# Patient Record
Sex: FEMALE | Race: ASIAN
[De-identification: names, ages, dates, MRNs, and addresses within clinical notes are randomized per-mention and may not be internally consistent; named-entity substitution may affect disease eponyms.]

---

## 2020-10-26 ENCOUNTER — DASHBOARD ENCOUNTERS (OUTPATIENT)
Age: 25
End: 2020-10-26

## 2020-10-26 ENCOUNTER — OFFICE VISIT (OUTPATIENT)
Dept: URBAN - METROPOLITAN AREA CLINIC 78 | Facility: CLINIC | Age: 25
End: 2020-10-26
Payer: COMMERCIAL

## 2020-10-26 ENCOUNTER — WEB ENCOUNTER (OUTPATIENT)
Dept: URBAN - METROPOLITAN AREA CLINIC 78 | Facility: CLINIC | Age: 25
End: 2020-10-26

## 2020-10-26 DIAGNOSIS — R63.5 WEIGHT GAIN: ICD-10-CM

## 2020-10-26 DIAGNOSIS — R19.7 DIARRHEA: ICD-10-CM

## 2020-10-26 DIAGNOSIS — K90.9 STEATORRHEA: ICD-10-CM

## 2020-10-26 DIAGNOSIS — R10.9 ABDOMINAL CRAMPS: ICD-10-CM

## 2020-10-26 DIAGNOSIS — F41.9 ANXIETY: ICD-10-CM

## 2020-10-26 DIAGNOSIS — K58.9 IBS (IRRITABLE BOWEL SYNDROME)-DIARRHEA: ICD-10-CM

## 2020-10-26 PROBLEM — 66187002 STEATORRHEA: Status: ACTIVE | Noted: 2020-10-26

## 2020-10-26 PROBLEM — 48694002 ANXIETY: Status: ACTIVE | Noted: 2020-10-26

## 2020-10-26 PROBLEM — 10743008 IRRITABLE BOWEL SYNDROME: Status: ACTIVE | Noted: 2020-10-26

## 2020-10-26 PROCEDURE — G9903 PT SCRN TBCO ID AS NON USER: HCPCS | Performed by: INTERNAL MEDICINE

## 2020-10-26 PROCEDURE — G8427 DOCREV CUR MEDS BY ELIG CLIN: HCPCS | Performed by: INTERNAL MEDICINE

## 2020-10-26 PROCEDURE — G8482 FLU IMMUNIZE ORDER/ADMIN: HCPCS | Performed by: INTERNAL MEDICINE

## 2020-10-26 PROCEDURE — G8417 CALC BMI ABV UP PARAM F/U: HCPCS | Performed by: INTERNAL MEDICINE

## 2020-10-26 PROCEDURE — 99214 OFFICE O/P EST MOD 30 MIN: CPT | Performed by: INTERNAL MEDICINE

## 2020-10-26 RX ORDER — HYOSCYAMINE SULFATE 0.12 MG/1
TAKE 1 TABLET (0.125 MG) BY ORAL ROUTE EVERY 6 HOURS AS NEEDED FOR ABDOMINAL CRAMPS TABLET ORAL
Qty: 30 | Refills: 2 | Status: ACTIVE | COMMUNITY
Start: 2016-09-22

## 2020-10-26 RX ORDER — HYOSCYAMINE SULFATE 0.12 MG/1
TAKE 1 TABLET (0.125 MG) BY ORAL ROUTE EVERY 6 HOURS AS NEEDED FOR ABDOMINAL CRAMPS TABLET ORAL
Qty: 30 | Refills: 2
Start: 2016-09-22

## 2020-10-26 RX ORDER — CHOLESTYRAMINE 4 G/9G
1 PACKET MIXED WITH WATER OR NON-CARBONATED DRINK POWDER, FOR SUSPENSION ORAL ONCE A DAY
Qty: 30 | OUTPATIENT
Start: 2020-10-26

## 2020-10-26 NOTE — HPI-TODAY'S VISIT:
The patient had initially seen me on 2/16/16 and had described having abdominal pain as moderate in intensity, sharp and crampy in nature, occurring intermittently and after meals , and lasting a few hours . The pain was periumbilical and in the upper abdomen and had been present for the past years; improved by bowel movements and aggravated by eating.  She also reported a history of chronic diarrhea. She states that at age 11 she was hospitalized with a "bad GI infection" and since then, her bowel habits had never been normal. Her symptoms  have affected her QOL.   She reports that she started taking a Lactobacillus and Bifidobacterium probiotic daily. She did very well for several months, but eventually started having diarrhea again. She has had some pain with defecation, not improved despite treatment with NTG ointment (althoug patient admits that she was not very compliant with this as this triggered severe burning).  She has not had heartburn or dysphagia. The patient denies much in the way of nausea, vomiting, constipation, melena, hematemesis, fevers, chills. She has been compliant with avoiding all lactose prodcuts.  I treated her with Xifaxan in late September 2016 and she took it for the two weeks as prsecribed. Initiailly she felt somewhat better, but about 2 weeks after completing the Xifaxan, she started noticing the postprandial diarrhea again.   She continues to have oily stools,. She has quite a bit of cramping, usually preceding the BM's. She tends to have 5-6 BM's daily. It tends to irritate the perianal itching and pain. She had tried Desitin and NTG but admits that she could not tolerate these very well. She also admits to ongoing bloating, particularly at nighttime. She continues to gain weight slowly. She is now weighing 137 lbs. She has cut carbs and cheese off.   She walks on her 2 off days a week, for a good 1-1 1/2 hours.      Although the patient had denied any surgical history at her last appointment she did inform me today that she  underwent a cholecystectomy in 2017.  Denies a FH of IBD or celiac sprue. No recent antibiotic use or travel. She completed her college studies in [employer], with a minor in [place]ian. Her parents are originally from [facility].  Today we once again reviewed the results of her colonoscopy and prior labs.  Summary of prior GI workup: - Colonoscopy by me on 3/7/16 revealed a normal colon to the TI, except for IH's. Random colon biopsies were unremarkable. -Labs on 2/16/16 revealed a negative celiac panel, negative H pylori breath test, ESR normal at 22, and mildly elevated CRP of 12.5.

## 2020-12-04 ENCOUNTER — ERX REFILL RESPONSE (OUTPATIENT)
Dept: URBAN - METROPOLITAN AREA CLINIC 78 | Facility: CLINIC | Age: 25
End: 2020-12-04

## 2020-12-04 RX ORDER — CHOLESTYRAMINE 4 G/9G
1 PACKET MIXED WITH WATER OR NON-CARBONATED DRINK POWDER, FOR SUSPENSION ORAL ONCE A DAY
Qty: 30 | Refills: 0

## 2020-12-10 ENCOUNTER — OFFICE VISIT (OUTPATIENT)
Dept: URBAN - METROPOLITAN AREA CLINIC 78 | Facility: CLINIC | Age: 25
End: 2020-12-10

## 2020-12-10 RX ORDER — CHOLESTYRAMINE 4 G/9G
1 PACKET MIXED WITH WATER OR NON-CARBONATED DRINK POWDER, FOR SUSPENSION ORAL ONCE A DAY
Qty: 30 | OUTPATIENT

## 2020-12-10 NOTE — HPI-TODAY'S VISIT:
The patient had initially seen me on 2/16/16 and had described having abdominal pain as moderate in intensity, sharp and crampy in nature, occurring intermittently and after meals , and lasting a few hours . The pain was periumbilical and in the upper abdomen and had been present for the past years; improved by bowel movements and aggravated by eating.  She also reported a history of chronic diarrhea. She states that at age 11 she was hospitalized with a "bad GI infection" and since then, her bowel habits had never been normal. Her symptoms  have affected her QOL.   She reports that she started taking a Lactobacillus and Bifidobacterium probiotic daily. She did very well for several months, but eventually started having diarrhea again. She has had some pain with defecation, not improved despite treatment with NTG ointment (althoug patient admits that she was not very compliant with this as this triggered severe burning).  She has not had heartburn or dysphagia. The patient denies much in the way of nausea, vomiting, constipation, melena, hematemesis, fevers, chills. She has been compliant with avoiding all lactose prodcuts.  I treated her with Xifaxan in late September 2016 and she took it for the two weeks as prsecribed. Initiailly she felt somewhat better, but about 2 weeks after completing the Xifaxan, she started noticing the postprandial diarrhea again.   She continues to have oily stools,. She has quite a bit of cramping, usually preceding the BM's. She tends to have 5-6 BM's daily. It tends to irritate the perianal itching and pain. She had tried Desitin and NTG but admits that she could not tolerate these very well. She also admits to ongoing bloating, particularly at nighttime. She continues to gain weight slowly. She is now weighing 137 lbs. She has cut carbs and cheese off.   She walks on her 2 off days a week, for a good 1-1 1/2 hours.      Although the patient had denied any surgical history at her last appointment she did inform me today that she  underwent a cholecystectomy in 2017.  Denies a FH of IBD or celiac sprue. No recent antibiotic use or travel. She completed her college studies in [employer], with a minor in [place]ian. Her parents are originally from [facility].  Today we once again reviewed the results of her colonoscopy and prior labs.  Summary of prior GI workup: - Colonoscopy by me on 3/7/16 revealed a normal colon to the TI, except for IH's. Random colon biopsies were unremarkable. -Labs on 2/16/16 revealed a negative celiac panel, negative H pylori breath test, ESR normal at 22, and mildly elevated CRP of 12.5.